# Patient Record
Sex: MALE | Race: BLACK OR AFRICAN AMERICAN | NOT HISPANIC OR LATINO | Employment: UNEMPLOYED | URBAN - METROPOLITAN AREA
[De-identification: names, ages, dates, MRNs, and addresses within clinical notes are randomized per-mention and may not be internally consistent; named-entity substitution may affect disease eponyms.]

---

## 2020-12-04 ENCOUNTER — TRANSCRIBE ORDERS (OUTPATIENT)
Dept: ADMINISTRATIVE | Facility: HOSPITAL | Age: 35
End: 2020-12-04

## 2020-12-04 DIAGNOSIS — M79.602 PAIN AND NUMBNESS OF LEFT UPPER EXTREMITY: Primary | ICD-10-CM

## 2020-12-04 DIAGNOSIS — R20.0 PAIN AND NUMBNESS OF LEFT UPPER EXTREMITY: Primary | ICD-10-CM

## 2021-01-26 ENCOUNTER — HOSPITAL ENCOUNTER (OUTPATIENT)
Dept: NEUROLOGY | Facility: HOSPITAL | Age: 36
Discharge: HOME | End: 2021-01-26
Admitting: ORTHOPAEDIC SURGERY

## 2021-01-26 DIAGNOSIS — M79.602 PAIN AND NUMBNESS OF LEFT UPPER EXTREMITY: ICD-10-CM

## 2021-01-26 DIAGNOSIS — R20.0 PAIN AND NUMBNESS OF LEFT UPPER EXTREMITY: ICD-10-CM

## 2021-01-26 PROCEDURE — 95908 NRV CNDJ TST 3-4 STUDIES: CPT

## 2021-01-26 PROCEDURE — 95886 MUSC TEST DONE W/N TEST COMP: CPT

## 2021-05-20 ENCOUNTER — HOSPITAL ENCOUNTER (OUTPATIENT)
Dept: GENERAL RADIOLOGY | Facility: HOSPITAL | Age: 36
Discharge: HOME | End: 2021-05-20

## 2021-05-20 ENCOUNTER — PRE-ADMISSION TESTING (OUTPATIENT)
Dept: PREADMISSION TESTING | Facility: HOSPITAL | Age: 36
End: 2021-05-20

## 2021-05-20 LAB
ANION GAP SERPL CALCULATED.3IONS-SCNC: 9 MMOL/L (ref 5–15)
BUN SERPL-MCNC: 12 MG/DL (ref 6–20)
BUN/CREAT SERPL: 13.2 (ref 7–25)
CALCIUM SPEC-SCNC: 9 MG/DL (ref 8.6–10.5)
CHLORIDE SERPL-SCNC: 103 MMOL/L (ref 98–107)
CO2 SERPL-SCNC: 29 MMOL/L (ref 22–29)
CREAT SERPL-MCNC: 0.91 MG/DL (ref 0.76–1.27)
DEPRECATED RDW RBC AUTO: 42.9 FL (ref 37–54)
ERYTHROCYTE [DISTWIDTH] IN BLOOD BY AUTOMATED COUNT: 12.5 % (ref 12.3–15.4)
GFR SERPL CREATININE-BSD FRML MDRD: 114 ML/MIN/1.73
GLUCOSE SERPL-MCNC: 82 MG/DL (ref 65–99)
HBA1C MFR BLD: 5.3 % (ref 4.8–5.6)
HCT VFR BLD AUTO: 41.7 % (ref 37.5–51)
HGB BLD-MCNC: 13.6 G/DL (ref 13–17.7)
MCH RBC QN AUTO: 30.6 PG (ref 26.6–33)
MCHC RBC AUTO-ENTMCNC: 32.6 G/DL (ref 31.5–35.7)
MCV RBC AUTO: 93.9 FL (ref 79–97)
PLATELET # BLD AUTO: 229 10*3/MM3 (ref 140–450)
PMV BLD AUTO: 10.4 FL (ref 6–12)
POTASSIUM SERPL-SCNC: 4.1 MMOL/L (ref 3.5–5.2)
QT INTERVAL: 412 MS
QTC INTERVAL: 414 MS
RBC # BLD AUTO: 4.44 10*6/MM3 (ref 4.14–5.8)
SODIUM SERPL-SCNC: 141 MMOL/L (ref 136–145)
WBC # BLD AUTO: 8.33 10*3/MM3 (ref 3.4–10.8)

## 2021-05-20 PROCEDURE — 71046 X-RAY EXAM CHEST 2 VIEWS: CPT

## 2021-05-20 PROCEDURE — 83036 HEMOGLOBIN GLYCOSYLATED A1C: CPT

## 2021-05-20 PROCEDURE — 80048 BASIC METABOLIC PNL TOTAL CA: CPT

## 2021-05-20 PROCEDURE — 93010 ELECTROCARDIOGRAM REPORT: CPT | Performed by: INTERNAL MEDICINE

## 2021-05-20 PROCEDURE — 93005 ELECTROCARDIOGRAM TRACING: CPT

## 2021-05-20 PROCEDURE — 85027 COMPLETE CBC AUTOMATED: CPT

## 2021-05-20 PROCEDURE — 36415 COLL VENOUS BLD VENIPUNCTURE: CPT

## 2021-06-01 ENCOUNTER — APPOINTMENT (OUTPATIENT)
Dept: PREADMISSION TESTING | Facility: HOSPITAL | Age: 36
End: 2021-06-01

## 2021-06-01 PROCEDURE — C9803 HOPD COVID-19 SPEC COLLECT: HCPCS

## 2021-06-01 PROCEDURE — U0004 COV-19 TEST NON-CDC HGH THRU: HCPCS

## 2021-06-02 LAB — SARS-COV-2 RNA PNL SPEC NAA+PROBE: NOT DETECTED

## 2021-06-03 DIAGNOSIS — M75.122 NONTRAUMATIC COMPLETE TEAR OF LEFT ROTATOR CUFF: Primary | ICD-10-CM

## 2021-06-03 RX ORDER — ONDANSETRON 4 MG/1
4 TABLET, FILM COATED ORAL EVERY 8 HOURS PRN
Qty: 12 TABLET | Refills: 0 | Status: SHIPPED | OUTPATIENT
Start: 2021-06-03

## 2021-06-03 RX ORDER — HYDROCODONE BITARTRATE AND ACETAMINOPHEN 7.5; 325 MG/1; MG/1
1 TABLET ORAL EVERY 6 HOURS PRN
Qty: 24 TABLET | Refills: 0 | Status: SHIPPED | OUTPATIENT
Start: 2021-06-03

## 2021-06-03 RX ORDER — DOCUSATE SODIUM 100 MG/1
100 CAPSULE, LIQUID FILLED ORAL 2 TIMES DAILY
Qty: 20 CAPSULE | Refills: 0 | Status: SHIPPED | OUTPATIENT
Start: 2021-06-03 | End: 2021-12-23

## 2021-09-29 ENCOUNTER — TRANSCRIBE ORDERS (OUTPATIENT)
Dept: ADMINISTRATIVE | Facility: HOSPITAL | Age: 36
End: 2021-09-29

## 2021-09-29 DIAGNOSIS — M79.603 PAIN AND NUMBNESS OF UPPER EXTREMITY: Primary | ICD-10-CM

## 2021-09-29 DIAGNOSIS — R20.0 PAIN AND NUMBNESS OF UPPER EXTREMITY: Primary | ICD-10-CM

## 2021-11-08 ENCOUNTER — HOSPITAL ENCOUNTER (OUTPATIENT)
Dept: NEUROLOGY | Facility: HOSPITAL | Age: 36
Discharge: HOME | End: 2021-11-08
Admitting: ORTHOPAEDIC SURGERY

## 2021-11-08 DIAGNOSIS — M79.603 PAIN AND NUMBNESS OF UPPER EXTREMITY: ICD-10-CM

## 2021-11-08 DIAGNOSIS — R20.0 PAIN AND NUMBNESS OF UPPER EXTREMITY: ICD-10-CM

## 2021-11-08 PROCEDURE — 95886 MUSC TEST DONE W/N TEST COMP: CPT

## 2021-11-08 PROCEDURE — 95908 NRV CNDJ TST 3-4 STUDIES: CPT

## 2021-12-23 ENCOUNTER — OFFICE VISIT (OUTPATIENT)
Dept: NEUROSURGERY | Facility: CLINIC | Age: 36
End: 2021-12-23

## 2021-12-23 VITALS
DIASTOLIC BLOOD PRESSURE: 88 MMHG | BODY MASS INDEX: 29.29 KG/M2 | TEMPERATURE: 96.9 F | WEIGHT: 186.6 LBS | SYSTOLIC BLOOD PRESSURE: 128 MMHG | HEIGHT: 67 IN

## 2021-12-23 DIAGNOSIS — M25.519 NECK AND SHOULDER PAIN: ICD-10-CM

## 2021-12-23 DIAGNOSIS — M50.30 DDD (DEGENERATIVE DISC DISEASE), CERVICAL: Primary | ICD-10-CM

## 2021-12-23 DIAGNOSIS — M54.2 NECK AND SHOULDER PAIN: ICD-10-CM

## 2021-12-23 PROCEDURE — 99203 OFFICE O/P NEW LOW 30 MIN: CPT | Performed by: NEUROLOGICAL SURGERY

## 2021-12-23 RX ORDER — MELOXICAM 15 MG/1
TABLET ORAL
COMMUNITY
Start: 2021-11-21

## 2021-12-23 RX ORDER — PREDNISONE 20 MG/1
TABLET ORAL
COMMUNITY
Start: 2021-11-23

## 2021-12-23 RX ORDER — ZOLPIDEM TARTRATE 5 MG/1
TABLET ORAL
COMMUNITY
Start: 2021-10-28

## 2021-12-23 RX ORDER — GABAPENTIN 300 MG/1
CAPSULE ORAL
COMMUNITY
Start: 2021-12-02

## 2021-12-23 RX ORDER — HYDROCODONE BITARTRATE AND ACETAMINOPHEN 10; 325 MG/1; MG/1
TABLET ORAL
COMMUNITY
Start: 2021-11-29

## 2021-12-23 NOTE — PROGRESS NOTES
NAME: JORGE LUIS ADAME   DOS: 2021  : 1985  PCP: Mari Zuñiga MD    Chief Complaint:    Chief Complaint   Patient presents with   • Neck Pain     NEW PT       History of Present Illness:  36 y.o. male   I saw this 36-year-old in neurosurgical consultation.  He has a history of chronic axial neck pain has reportedly seen 2 surgeons who have offered surgery in the past but he declined.  He had experienced a rotator cuff left upper extremity pain he underwent surgery but unfortunately is still had left-sided upper extremity dysfunction pain and numbness and tingling.  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    He is here for evaluation he states that he is got pain in the left parascapular area paraspinal supraspinatus region with occasional radiation in the hand he is here for evaluation    PMHX  Allergies:  No Known Allergies  Medications    Current Outpatient Medications:   •  gabapentin (NEURONTIN) 300 MG capsule, , Disp: , Rfl:   •  HYDROcodone-acetaminophen (NORCO)  MG per tablet, , Disp: , Rfl:   •  HYDROcodone-acetaminophen (Norco) 7.5-325 MG per tablet, Take 1 tablet by mouth Every 6 (Six) Hours As Needed for Moderate Pain ., Disp: 24 tablet, Rfl: 0  •  meloxicam (MOBIC) 15 MG tablet, , Disp: , Rfl:   •  ondansetron (Zofran) 4 MG tablet, Take 1 tablet by mouth Every 8 (Eight) Hours As Needed for Nausea or Vomiting., Disp: 12 tablet, Rfl: 0  •  predniSONE (DELTASONE) 20 MG tablet, , Disp: , Rfl:   •  zolpidem (AMBIEN) 5 MG tablet, , Disp: , Rfl:   Past Medical History:  History reviewed. No pertinent past medical history.  Past Surgical History:  Past Surgical History:   Procedure Laterality Date   • HAND SURGERY Left    • SHOULDER SURGERY       Social Hx:  Social History     Tobacco Use   • Smoking status: Current Some Day Smoker     Packs/day: 0.25   • Smokeless tobacco: Not on file   Vaping Use   •  Vaping Use: Every day   • Substances: Nicotine   • Devices: [employer] tank   Substance Use Topics   • Alcohol use: No   • Drug use: Yes     Types: Marijuana     Family Hx:  History reviewed. No pertinent family history.  Review of Systems:        Review of Systems   Constitutional: Negative for activity change, appetite change, chills, diaphoresis, fatigue, fever and unexpected weight change.   HENT: Negative for congestion, dental problem, drooling, ear discharge, ear pain, facial swelling, hearing loss, mouth sores, nosebleeds, postnasal drip, rhinorrhea, sinus pressure, sinus pain, sneezing, sore throat, tinnitus, trouble swallowing and voice change.    Eyes: Negative for photophobia, pain, discharge, redness, itching and visual disturbance.   Respiratory: Negative for apnea, cough, choking, chest tightness, shortness of breath, wheezing and stridor.    Cardiovascular: Negative for chest pain, palpitations and leg swelling.   Gastrointestinal: Negative for abdominal distention, abdominal pain, anal bleeding, blood in stool, constipation, diarrhea, nausea, rectal pain and vomiting.   Endocrine: Negative for cold intolerance, heat intolerance, polydipsia, polyphagia and polyuria.   Genitourinary: Negative for decreased urine volume, difficulty urinating, dysuria, enuresis, flank pain, frequency, genital sores, hematuria and urgency.   Musculoskeletal: Positive for neck pain. Negative for arthralgias, back pain, gait problem, joint swelling, myalgias and neck stiffness.   Skin: Negative for color change, pallor, rash and wound.   Allergic/Immunologic: Negative for environmental allergies, food allergies and immunocompromised state.   Neurological: Negative for dizziness, tremors, seizures, syncope, facial asymmetry, speech difficulty, weakness, light-headedness, numbness and headaches.   Hematological: Negative for adenopathy. Does not bruise/bleed easily.   Psychiatric/Behavioral: Negative for agitation,  "behavioral problems, confusion, decreased concentration, dysphoric mood, hallucinations, self-injury, sleep disturbance and suicidal ideas. The patient is not nervous/anxious and is not hyperactive.       I have reviewed this note template and all pertinent parts of the review of systems social, family history, surgical history and medication list      Physical Examination:  Vitals:    12/23/21 1431   BP: 128/88   Temp: 96.9 °F (36.1 °C)      General Appearance:   Well developed, well nourished, well groomed, alert, and cooperative.  Neurological examination:  Neurologic Exam  Vital signs were reviewed and documented in the chart  Patient appeared in good neurologic function with normal comprehension fluent speech  Mood and affect are normal  Sense of smell deferred  \"Chronic sinusitis \"noted mass left in place    Muscle bulk and tone normal  5 out of 5 strength no motor drift-no atrophy  Gait normal intact  Negative Romberg  No clonus long tract signs or myelopathy  He is pain with passive and active range of motion shoulders and neck  Reflexes symmetric-trace throughout  Arms show no edema noted and extremities skin appears normal          Review of Imaging/DATA:  MRI was personally reviewed that shows cervical stenosis of mild intensity his cervical spines in flex position he is got a C3-4 disc bulge he is got a C5-6 left foraminal disc bulge as well but nothing high-grade or central  Diagnoses/Plan:    Mr. Sanford is a 36 y.o. male   1.  Chronic left upper extremity dysfunction partially secondary to left prior shoulder surgery with some residual decreased range of motion with passive and active range of motion  2.  Chronic axial neck pain-overtones of chronic C6 left radiculopathy subtle in nature mostly left paraspinal in nature    I recommended  -Physical therapy  -Referral to interventional pain management for trigger point injections left C6 transforaminal block    3.  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  I will have him touch base with a PA in 90 days-if he saline conservative management we can contemplate myelogram EMG nerve conduction study if he wishes to pursue further work-up HOWEVER I explained that there is nothing that on his MRI would suggest that he require surgery or that I would be able to reliably \"fix \"given an almost 20-year pain history  If he is improved he can follow-up as needed    "

## 2024-05-23 ENCOUNTER — OFFICE VISIT (OUTPATIENT)
Dept: INTERNAL MEDICINE | Facility: CLINIC | Age: 39
End: 2024-05-23
Payer: MEDICAID

## 2024-05-23 VITALS
WEIGHT: 186.6 LBS | OXYGEN SATURATION: 95 % | HEART RATE: 64 BPM | HEIGHT: 66 IN | DIASTOLIC BLOOD PRESSURE: 68 MMHG | TEMPERATURE: 96.8 F | SYSTOLIC BLOOD PRESSURE: 136 MMHG | BODY MASS INDEX: 29.99 KG/M2

## 2024-05-23 DIAGNOSIS — Z87.19 STATUS POST INGUINAL HERNIA REPAIR: ICD-10-CM

## 2024-05-23 DIAGNOSIS — M54.2 CHRONIC NECK AND BACK PAIN: Primary | ICD-10-CM

## 2024-05-23 DIAGNOSIS — M54.9 CHRONIC NECK AND BACK PAIN: Primary | ICD-10-CM

## 2024-05-23 DIAGNOSIS — Z98.890 STATUS POST INGUINAL HERNIA REPAIR: ICD-10-CM

## 2024-05-23 DIAGNOSIS — K40.90 INGUINAL HERNIA WITHOUT OBSTRUCTION OR GANGRENE, RECURRENCE NOT SPECIFIED, UNSPECIFIED LATERALITY: ICD-10-CM

## 2024-05-23 DIAGNOSIS — K59.03 DRUG-INDUCED CONSTIPATION: ICD-10-CM

## 2024-05-23 DIAGNOSIS — M50.30 DEGENERATIVE CERVICAL DISC: ICD-10-CM

## 2024-05-23 DIAGNOSIS — G89.29 CHRONIC NECK AND BACK PAIN: Primary | ICD-10-CM

## 2024-05-23 PROCEDURE — 99204 OFFICE O/P NEW MOD 45 MIN: CPT | Performed by: STUDENT IN AN ORGANIZED HEALTH CARE EDUCATION/TRAINING PROGRAM

## 2024-05-23 RX ORDER — TIZANIDINE 4 MG/1
4 TABLET ORAL EVERY 6 HOURS PRN
COMMUNITY

## 2024-05-23 RX ORDER — GABAPENTIN 400 MG/1
400 CAPSULE ORAL DAILY
Qty: 30 CAPSULE | Refills: 2 | Status: SHIPPED | OUTPATIENT
Start: 2024-05-23

## 2024-05-23 RX ORDER — GABAPENTIN 800 MG/1
800 TABLET ORAL 2 TIMES DAILY
COMMUNITY
Start: 2023-11-27 | End: 2024-05-23

## 2024-05-23 RX ORDER — OXYCODONE AND ACETAMINOPHEN 10; 325 MG/1; MG/1
1 TABLET ORAL EVERY 8 HOURS PRN
COMMUNITY
Start: 2024-04-01 | End: 2025-05-01

## 2024-05-23 RX ORDER — OXYCODONE AND ACETAMINOPHEN 10; 325 MG/1; MG/1
1 TABLET ORAL EVERY 8 HOURS PRN
Qty: 90 TABLET | Refills: 0 | Status: SHIPPED | OUTPATIENT
Start: 2024-05-23

## 2024-05-23 RX ORDER — KETOROLAC TROMETHAMINE 10 MG/1
10 TABLET, FILM COATED ORAL EVERY 6 HOURS PRN
COMMUNITY
Start: 2024-03-25
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
## 2024-05-24 ENCOUNTER — PATIENT ROUNDING (BHMG ONLY) (OUTPATIENT)
Dept: INTERNAL MEDICINE | Facility: CLINIC | Age: 39
End: 2024-05-24
Payer: MEDICAID

## 2024-05-24 NOTE — PROGRESS NOTES
My name is Arlen Pennington and I am a patient experience representative for [facility] on [facility].    I would like to officially welcome you to our practice.  If you do not mind I would like to ask you a few questions about your recent visit with us.  If you do not have the time to reply that is perfectly fine.      First, could you tell me what went well with your recent visit?     Secondly, we are always looking for ways to make our patients' experiences even better. Do you have any recommendations on ways we may improve?     Third, safety is a top priority therefore do you have any concerns with that while in our office?    Finally, overall were you satisfied with your first visit to our practice?     Thank you for taking the time to answer a few questions today.  In the coming days you will receive a survey.  We encourage you to complete the survey to let us know how we did!        Arlen

## 2024-05-28 ENCOUNTER — PRIOR AUTHORIZATION (OUTPATIENT)
Dept: INTERNAL MEDICINE | Facility: CLINIC | Age: 39
End: 2024-05-28
Payer: MEDICAID

## 2024-05-28 NOTE — TELEPHONE ENCOUNTER
PA submitted on linzess 72 mcg.  KEY:  [id number]  Can you tell me the diagnosis for this medication?  Office note is not complete.

## 2024-05-29 ENCOUNTER — TELEPHONE (OUTPATIENT)
Dept: INTERNAL MEDICINE | Facility: CLINIC | Age: 39
End: 2024-05-29

## 2024-05-29 NOTE — TELEPHONE ENCOUNTER
Caller: [facility] [id number] - [place], [place] - [address] - [phone]  - [phone] FX    Relationship: Pharmacy    What was the call regarding: PHARMACY HAS CONCERNS THAT MULTIPLE TIMES PATIENT HAS COME TO  OXYCODONE HE HAS BEEN UNDER THE INFLUENCE OF MARIJUANA. ALSO PICKING UP HIS MEDICATION EARLY EVERY MONTH. LEFT A JOINT ON THE COUNTER. REQUESTING THAT PRESCRIPTION ONLY TO BE REFILLED EVERY 30 DAYS. HE IS COMING IN EVERY 28 DAYS TO GET MEDICATION.

## 2024-06-02 LAB
DRUGS UR: NORMAL
Lab: NORMAL

## 2024-06-06 ENCOUNTER — OFFICE VISIT (OUTPATIENT)
Dept: INTERNAL MEDICINE | Facility: CLINIC | Age: 39
End: 2024-06-06
Payer: MEDICAID

## 2024-06-06 VITALS
BODY MASS INDEX: 30.37 KG/M2 | WEIGHT: 189 LBS | OXYGEN SATURATION: 99 % | TEMPERATURE: 97.1 F | SYSTOLIC BLOOD PRESSURE: 112 MMHG | HEIGHT: 66 IN | DIASTOLIC BLOOD PRESSURE: 80 MMHG | HEART RATE: 68 BPM

## 2024-06-06 DIAGNOSIS — M50.30 DEGENERATIVE CERVICAL DISC: ICD-10-CM

## 2024-06-06 DIAGNOSIS — M54.2 CHRONIC NECK AND BACK PAIN: Primary | ICD-10-CM

## 2024-06-06 DIAGNOSIS — Z76.0 MEDICATION REFILL: ICD-10-CM

## 2024-06-06 DIAGNOSIS — G89.29 CHRONIC NECK AND BACK PAIN: Primary | ICD-10-CM

## 2024-06-06 DIAGNOSIS — K59.03 DRUG-INDUCED CONSTIPATION: ICD-10-CM

## 2024-06-06 DIAGNOSIS — G62.9 NEUROPATHY: ICD-10-CM

## 2024-06-06 DIAGNOSIS — M54.9 CHRONIC NECK AND BACK PAIN: Primary | ICD-10-CM

## 2024-06-06 PROCEDURE — 1159F MED LIST DOCD IN RCRD: CPT | Performed by: STUDENT IN AN ORGANIZED HEALTH CARE EDUCATION/TRAINING PROGRAM

## 2024-06-06 PROCEDURE — 1160F RVW MEDS BY RX/DR IN RCRD: CPT | Performed by: STUDENT IN AN ORGANIZED HEALTH CARE EDUCATION/TRAINING PROGRAM

## 2024-06-06 PROCEDURE — 99214 OFFICE O/P EST MOD 30 MIN: CPT | Performed by: STUDENT IN AN ORGANIZED HEALTH CARE EDUCATION/TRAINING PROGRAM

## 2024-06-06 RX ORDER — SODIUM CHLORIDE 0.65 %
1 AEROSOL, SPRAY (ML) NASAL AS NEEDED
COMMUNITY
Start: 2024-05-20
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
## 2024-06-18 ENCOUNTER — TELEPHONE (OUTPATIENT)
Dept: INTERNAL MEDICINE | Facility: CLINIC | Age: 39
End: 2024-06-18
Payer: MEDICAID

## 2024-06-19 DIAGNOSIS — M54.9 CHRONIC NECK AND BACK PAIN: ICD-10-CM

## 2024-06-19 DIAGNOSIS — M54.2 CHRONIC NECK AND BACK PAIN: ICD-10-CM

## 2024-06-19 DIAGNOSIS — G89.29 CHRONIC NECK AND BACK PAIN: ICD-10-CM

## 2024-06-19 RX ORDER — OXYCODONE AND ACETAMINOPHEN 10; 325 MG/1; MG/1
1 TABLET ORAL EVERY 8 HOURS PRN
Qty: 90 TABLET | Refills: 0 | Status: SHIPPED | OUTPATIENT
Start: 2024-06-23 | End: 2024-07-23

## 2024-06-19 RX ORDER — GABAPENTIN 400 MG/1
400 CAPSULE ORAL 2 TIMES DAILY
Qty: 180 CAPSULE | Refills: 0 | Status: SHIPPED | OUTPATIENT
Start: 2024-06-23 | End: 2024-09-21

## 2024-06-19 NOTE — TELEPHONE ENCOUNTER
Pharmacy Name:  [facility] [id number] - [place], [place] - [address] - [phone]  - [phone] FX (Pharmacy)     Pharmacy representative name: VIVEK    Pharmacy representative phone number: [phone]    What medication are you calling in regards to: GABAPENTIN    What question does the pharmacy have: VIVEK STATED THAT THE PATIENT IS GETTING THIS MEDICATION FROM ANOTHER PROVIDER. PATIENT IS TAKING  800 MG TWICE A DAY. VIVEK DIDN'T KNOW IF DR LANDAVERDE WAS AWARE.     Who is the provider that prescribed the medication: DR LANDAVERDE  
Dr. Zuñiga is his old PCP who is gone. His next refill should be for 6/20/2024 
Spoke with the pharmacy and let them know not to fill any medication from a Dr. Zuñiga.  Only fill gabapentin from Dr. Jurado.  Pharmacy verbalized understanding and canceled the old script.  
Spoke with the pharmacy and patient did have a script that had now  from a Dr. Zuñiga.  This was written 6 months ago.  He should have some left over from that script.  He said it was a little to early for a refill.  I asked he hold the script from Dr. Jurado until he replies to this message.  
no

## 2024-06-24 ENCOUNTER — PRIOR AUTHORIZATION (OUTPATIENT)
Dept: INTERNAL MEDICINE | Facility: CLINIC | Age: 39
End: 2024-06-24
Payer: MEDICAID

## 2024-06-24 RX ORDER — NALOXONE HYDROCHLORIDE 4 MG/.1ML
SPRAY NASAL
Qty: 2 EACH | Refills: 1 | Status: SHIPPED | OUTPATIENT
Start: 2024-06-24

## 2024-06-24 NOTE — TELEPHONE ENCOUNTER
PA submitted on oxycodone .  KEY:  [id number]  For this PA can you prescribe narcan for this patient?

## 2024-07-18 DIAGNOSIS — G89.29 CHRONIC NECK AND BACK PAIN: ICD-10-CM

## 2024-07-18 DIAGNOSIS — M54.9 CHRONIC NECK AND BACK PAIN: ICD-10-CM

## 2024-07-18 DIAGNOSIS — M50.30 DEGENERATIVE CERVICAL DISC: ICD-10-CM

## 2024-07-18 DIAGNOSIS — M54.2 CHRONIC NECK AND BACK PAIN: ICD-10-CM

## 2024-07-18 NOTE — TELEPHONE ENCOUNTER
Rx Refill Note  Requested Prescriptions     Pending Prescriptions Disp Refills    tiZANidine (ZANAFLEX) 4 MG tablet       Sig: Take 1 tablet by mouth Every 6 (Six) Hours As Needed.    gabapentin (NEURONTIN) 400 MG capsule 180 capsule 0     Sig: Take 1 capsule by mouth 2 (Two) Times a Day for 90 days.    oxyCODONE-acetaminophen (Percocet)  MG per tablet 90 tablet 0     Sig: Take 1 tablet by mouth Every 8 (Eight) Hours As Needed for Moderate Pain for up to 30 days.      Last office visit with prescribing clinician: 6/6/2024   Last telemedicine visit with prescribing clinician: Visit date not found   Next office visit with prescribing clinician: 8/28/2024     Brenna Phillips MA  07/18/24, 11:51 EDT

## 2024-07-19 RX ORDER — TIZANIDINE 4 MG/1
4 TABLET ORAL EVERY 8 HOURS PRN
Qty: 90 TABLET | Refills: 2 | Status: SHIPPED | OUTPATIENT
Start: 2024-07-19

## 2024-07-19 RX ORDER — OXYCODONE AND ACETAMINOPHEN 10; 325 MG/1; MG/1
1 TABLET ORAL EVERY 8 HOURS PRN
Qty: 90 TABLET | Refills: 0 | Status: SHIPPED | OUTPATIENT
Start: 2024-07-19 | End: 2024-08-18

## 2024-07-19 RX ORDER — GABAPENTIN 400 MG/1
400 CAPSULE ORAL 2 TIMES DAILY
Qty: 180 CAPSULE | Refills: 0 | Status: SHIPPED | OUTPATIENT
Start: 2024-07-19 | End: 2024-10-17

## 2024-08-18 DIAGNOSIS — M54.2 CHRONIC NECK AND BACK PAIN: ICD-10-CM

## 2024-08-18 DIAGNOSIS — G89.29 CHRONIC NECK AND BACK PAIN: ICD-10-CM

## 2024-08-18 DIAGNOSIS — M50.30 DEGENERATIVE CERVICAL DISC: ICD-10-CM

## 2024-08-18 DIAGNOSIS — M54.9 CHRONIC NECK AND BACK PAIN: ICD-10-CM

## 2024-08-19 RX ORDER — OXYCODONE AND ACETAMINOPHEN 10; 325 MG/1; MG/1
1 TABLET ORAL EVERY 8 HOURS PRN
Qty: 90 TABLET | Refills: 0 | Status: SHIPPED | OUTPATIENT
Start: 2024-08-19 | End: 2024-09-18

## 2024-08-19 RX ORDER — GABAPENTIN 400 MG/1
400 CAPSULE ORAL 2 TIMES DAILY
Qty: 180 CAPSULE | Refills: 0 | Status: SHIPPED | OUTPATIENT
Start: 2024-08-19 | End: 2024-11-17

## 2024-08-19 RX ORDER — TIZANIDINE 4 MG/1
4 TABLET ORAL EVERY 8 HOURS PRN
Qty: 90 TABLET | Refills: 2 | Status: SHIPPED | OUTPATIENT
Start: 2024-08-19

## 2024-08-19 NOTE — TELEPHONE ENCOUNTER
REFILL REQUEST TIZANIDINE TOO SOON TO REFILL   LAST REFILL 7/19/24     GABAPENTIN TOO SOON TO REFILL   LAST REFILL 7/19/24    OXYCODONE   LAST VISIT 7/19/24  LAST VISIT 6/6/24  NEXT VISIT 8/28/24

## 2024-08-25 DIAGNOSIS — K59.03 DRUG-INDUCED CONSTIPATION: ICD-10-CM

## 2024-08-26 RX ORDER — LINACLOTIDE 72 UG/1
72 CAPSULE, GELATIN COATED ORAL
Qty: 90 CAPSULE | Refills: 0 | Status: SHIPPED | OUTPATIENT
Start: 2024-08-26

## 2024-08-28 ENCOUNTER — OFFICE VISIT (OUTPATIENT)
Dept: INTERNAL MEDICINE | Facility: CLINIC | Age: 39
End: 2024-08-28
Payer: MEDICAID

## 2024-08-28 VITALS
TEMPERATURE: 97.1 F | DIASTOLIC BLOOD PRESSURE: 72 MMHG | SYSTOLIC BLOOD PRESSURE: 132 MMHG | HEART RATE: 61 BPM | BODY MASS INDEX: 31.27 KG/M2 | WEIGHT: 194.6 LBS | OXYGEN SATURATION: 96 % | HEIGHT: 66 IN

## 2024-08-28 DIAGNOSIS — M54.9 CHRONIC NECK AND BACK PAIN: Primary | ICD-10-CM

## 2024-08-28 DIAGNOSIS — G62.9 NEUROPATHY: ICD-10-CM

## 2024-08-28 DIAGNOSIS — M54.2 CHRONIC NECK AND BACK PAIN: Primary | ICD-10-CM

## 2024-08-28 DIAGNOSIS — Z76.0 MEDICATION REFILL: ICD-10-CM

## 2024-08-28 DIAGNOSIS — G89.29 CHRONIC NECK AND BACK PAIN: Primary | ICD-10-CM

## 2024-08-28 PROCEDURE — 1160F RVW MEDS BY RX/DR IN RCRD: CPT | Performed by: STUDENT IN AN ORGANIZED HEALTH CARE EDUCATION/TRAINING PROGRAM

## 2024-08-28 PROCEDURE — 1159F MED LIST DOCD IN RCRD: CPT | Performed by: STUDENT IN AN ORGANIZED HEALTH CARE EDUCATION/TRAINING PROGRAM

## 2024-08-28 PROCEDURE — 99214 OFFICE O/P EST MOD 30 MIN: CPT | Performed by: STUDENT IN AN ORGANIZED HEALTH CARE EDUCATION/TRAINING PROGRAM

## 2024-08-28 RX ORDER — VENLAFAXINE HYDROCHLORIDE 37.5 MG/1
37.5 CAPSULE, EXTENDED RELEASE ORAL DAILY
COMMUNITY
Start: 2024-07-24

## 2024-08-28 RX ORDER — BACLOFEN 10 MG/1
10 TABLET ORAL DAILY
Qty: 30 TABLET | Refills: 0 | Status: SHIPPED | OUTPATIENT
Start: 2024-08-28

## 2024-08-28 NOTE — PROGRESS NOTES
Office Note     Name: Ernie Sanford    : 1985     MRN: [id number]     Chief Complaint  Med Refill    Subjective     History of Present Illness:  Ernie Sanford is a 38 y.o. male who presents today for     Follow up for chronic neck pain  He is one chronic opioid pain therapy for this. He takes percocet TID  He sees orthopedics at [facility] and was suppose to see Pain management with Dr. Gallagher for his chronic neck pain.   To discuss interventional pain management, he wants to hold off on surgery for now.    Has complaints of pain down both his arms with numbness/paresthesias. Denies motor weakness, no loss of sensation. He is able to use his hands to complete tasks but deals with aches in his joints.   In the past he has tried, PT, multiple injections, he has seen the neck doctor and interventional pain management, he has discussed having surgery for his neck.       Review of Systems:   Review of Systems   All other systems reviewed and are negative.      Past Medical History:   Past Medical History:   Diagnosis Date    Arthritis     Drug induced constipation     Headache        Past Surgical History:   Past Surgical History:   Procedure Laterality Date    HAND SURGERY Left     HERNIA REPAIR      SHOULDER SURGERY         Family History:   Family History   Problem Relation Age of Onset    Hypertension Mother     Diabetes Mother        Social History:   Social History     Socioeconomic History    Marital status:    Tobacco Use    Smoking status: Some Days     Current packs/day: 0.25     Types: Cigarettes    Smokeless tobacco: Never   Vaping Use    Vaping status: Every Day    Substances: Nicotine    Devices: Refillable tank   Substance and Sexual Activity    Alcohol use: No    Drug use: Yes     Types: Marijuana    Sexual activity: Defer       Immunizations:   Immunization History   Administered Date(s) Administered    COVID-19 (PFIZER) Purple Cap Monovalent 2021, 2021    Hep  "B, Adolescent or Pediatric 10/04/1999    Hep B, Unspecified 08/13/1996    Hepatitis B Adolescent High Risk Infant 03/20/1998    Hepatitis B Adult/Adolescent IM 01/14/2005, 02/18/2005, 04/25/2007    MMR 08/13/1996    PPD Test 01/12/2005, 01/25/2005, 04/25/2007    Td (TDVAX) 03/20/1998, 04/24/2012        Medications:     Current Outpatient Medications:     [facility] Saline Nasal [place] 0.65 % nasal spray, 1 spray into the nostril(s) as directed by provider As Needed., Disp: , Rfl:     gabapentin (NEURONTIN) 400 MG capsule, Take 1 capsule by mouth 2 (Two) Times a Day for 90 days., Disp: 180 capsule, Rfl: 0    ketorolac (TORADOL) 10 MG tablet, Take 1 tablet by mouth Every 6 (Six) Hours As Needed., Disp: , Rfl:     Linzess 72 MCG capsule capsule, TAKE ONE CAPSULE BY MOUTH EVERY MORNING BEFORE BREAKFAST, Disp: 90 capsule, Rfl: 0    naloxone (NARCAN) 4 MG/0.1ML nasal spray, Call 911. Don't prime. [place] in 1 nostril for overdose. Repeat in 2-3 minutes in other nostril if no or minimal breathing/responsiveness., Disp: 2 each, Rfl: 1    oxyCODONE-acetaminophen (Percocet)  MG per tablet, Take 1 tablet by mouth Every 8 (Eight) Hours As Needed for Moderate Pain for up to 30 days., Disp: 90 tablet, Rfl: 0    tiZANidine (ZANAFLEX) 4 MG tablet, Take 1 tablet by mouth Every 8 (Eight) Hours As Needed for Muscle Spasms., Disp: 90 tablet, Rfl: 2    venlafaxine XR (EFFEXOR-XR) 37.5 MG 24 hr capsule, Take 1 capsule by mouth Daily., Disp: , Rfl:     baclofen (LIORESAL) 10 MG tablet, Take 1 tablet by mouth Daily., Disp: 30 tablet, Rfl: 0    meloxicam (MOBIC) 15 MG tablet, , Disp: , Rfl:     Allergies:   Allergies   Allergen Reactions    Bupropion Rash     \"Snapped\" emotional change    Acetaminophen Rash    Aspirin Rash    Ibuprofen Rash       Objective     Vital Signs  /72   Pulse 61   Temp 97.1 °F (36.2 °C) (Temporal)   Ht 168.3 cm (66.26\")   Wt 88.3 kg (194 lb 9.6 oz)   SpO2 96%   BMI 31.16 kg/m²   Estimated body mass index is " "31.16 kg/m² as calculated from the following:    Height as of this encounter: 168.3 cm (66.26\").    Weight as of this encounter: 88.3 kg (194 lb 9.6 oz).            Physical Exam  Constitutional:       Appearance: Normal appearance.   Cardiovascular:      Rate and Rhythm: Normal rate and regular rhythm.      Pulses: Normal pulses.      Heart sounds: Normal heart sounds.   Pulmonary:      Effort: Pulmonary effort is normal.      Breath sounds: Normal breath sounds.   Neurological:      Mental Status: He is alert.          Result Review :                  Assessment and Plan     1. Chronic neck and back pain  Trial of baclofen   Med refills for gabapentin and percocet have been sent, salomón reviewed, UDS on file CSA signed    - baclofen (LIORESAL) 10 MG tablet; Take 1 tablet by mouth Daily.  Dispense: 30 tablet; Refill: 0    2. Neuropathy  Med refills for gabapentin and percocet have been sent, salomón reviewed, UDS on file CSA signed    - baclofen (LIORESAL) 10 MG tablet; Take 1 tablet by mouth Daily.  Dispense: 30 tablet; Refill: 0    3. Medication refill    - venlafaxine XR (EFFEXOR-XR) 37.5 MG 24 hr capsule; Take 1 capsule by mouth Daily.  - baclofen (LIORESAL) 10 MG tablet; Take 1 tablet by mouth Daily.  Dispense: 30 tablet; Refill: 0       Follow Up  Return in about 2 months (around 11/11/2024).    Gus Jurado MD  [facility]  [facility]  [address] [address] [phone]    "

## 2024-09-16 DIAGNOSIS — Z76.0 MEDICATION REFILL: ICD-10-CM

## 2024-09-16 DIAGNOSIS — G89.29 CHRONIC NECK AND BACK PAIN: ICD-10-CM

## 2024-09-16 DIAGNOSIS — M54.9 CHRONIC NECK AND BACK PAIN: ICD-10-CM

## 2024-09-16 DIAGNOSIS — K59.03 DRUG-INDUCED CONSTIPATION: ICD-10-CM

## 2024-09-16 DIAGNOSIS — M54.2 CHRONIC NECK AND BACK PAIN: ICD-10-CM

## 2024-09-16 DIAGNOSIS — G62.9 NEUROPATHY: ICD-10-CM

## 2024-09-16 RX ORDER — VENLAFAXINE HYDROCHLORIDE 37.5 MG/1
37.5 CAPSULE, EXTENDED RELEASE ORAL DAILY
Qty: 90 CAPSULE | Refills: 3 | Status: SHIPPED | OUTPATIENT
Start: 2024-09-16

## 2024-09-16 RX ORDER — OXYCODONE AND ACETAMINOPHEN 10; 325 MG/1; MG/1
1 TABLET ORAL EVERY 8 HOURS PRN
Qty: 90 TABLET | Refills: 0 | Status: SHIPPED | OUTPATIENT
Start: 2024-09-16 | End: 2024-10-16

## 2024-09-16 RX ORDER — BACLOFEN 10 MG/1
10 TABLET ORAL DAILY
Qty: 30 TABLET | Refills: 0 | Status: SHIPPED | OUTPATIENT
Start: 2024-09-16

## 2024-10-14 DIAGNOSIS — M54.9 CHRONIC NECK AND BACK PAIN: ICD-10-CM

## 2024-10-14 DIAGNOSIS — M54.2 CHRONIC NECK AND BACK PAIN: ICD-10-CM

## 2024-10-14 DIAGNOSIS — G89.29 CHRONIC NECK AND BACK PAIN: ICD-10-CM

## 2024-10-14 RX ORDER — OXYCODONE AND ACETAMINOPHEN 10; 325 MG/1; MG/1
1 TABLET ORAL EVERY 8 HOURS PRN
Qty: 90 TABLET | Refills: 0 | Status: SHIPPED | OUTPATIENT
Start: 2024-10-14 | End: 2024-11-13

## 2024-10-28 DIAGNOSIS — M54.9 CHRONIC NECK AND BACK PAIN: ICD-10-CM

## 2024-10-28 DIAGNOSIS — M54.2 CHRONIC NECK AND BACK PAIN: ICD-10-CM

## 2024-10-28 DIAGNOSIS — G89.29 CHRONIC NECK AND BACK PAIN: ICD-10-CM

## 2024-10-28 DIAGNOSIS — G62.9 NEUROPATHY: ICD-10-CM

## 2024-10-28 DIAGNOSIS — Z76.0 MEDICATION REFILL: ICD-10-CM

## 2024-10-28 RX ORDER — BACLOFEN 10 MG/1
10 TABLET ORAL DAILY
Qty: 30 TABLET | Refills: 0 | Status: SHIPPED | OUTPATIENT
Start: 2024-10-28 | End: 2024-11-04 | Stop reason: SDUPTHER

## 2024-10-28 NOTE — TELEPHONE ENCOUNTER
Rx Refill Note  Requested Prescriptions     Pending Prescriptions Disp Refills    baclofen (LIORESAL) 10 MG tablet [Pharmacy Med Name: BACLOFEN 10 MG TABLET] 30 tablet 0     Sig: TAKE 1 TABLET BY MOUTH DAILY      Last office visit with prescribing clinician: 8/28/2024   Last telemedicine visit with prescribing clinician: Visit date not found   Next office visit with prescribing clinician: 11/4/2024     Brenna Phillips MA  10/28/24, 09:50 EDT

## 2024-11-04 ENCOUNTER — OFFICE VISIT (OUTPATIENT)
Dept: INTERNAL MEDICINE | Facility: CLINIC | Age: 39
End: 2024-11-04
Payer: MEDICAID

## 2024-11-04 DIAGNOSIS — M54.2 CHRONIC NECK AND BACK PAIN: Primary | ICD-10-CM

## 2024-11-04 DIAGNOSIS — M54.9 CHRONIC NECK AND BACK PAIN: Primary | ICD-10-CM

## 2024-11-04 DIAGNOSIS — Z76.0 MEDICATION REFILL: ICD-10-CM

## 2024-11-04 DIAGNOSIS — G62.9 NEUROPATHY: ICD-10-CM

## 2024-11-04 DIAGNOSIS — G89.29 CHRONIC NECK AND BACK PAIN: Primary | ICD-10-CM

## 2024-11-04 PROCEDURE — 1160F RVW MEDS BY RX/DR IN RCRD: CPT | Performed by: STUDENT IN AN ORGANIZED HEALTH CARE EDUCATION/TRAINING PROGRAM

## 2024-11-04 PROCEDURE — 1159F MED LIST DOCD IN RCRD: CPT | Performed by: STUDENT IN AN ORGANIZED HEALTH CARE EDUCATION/TRAINING PROGRAM

## 2024-11-04 PROCEDURE — 99214 OFFICE O/P EST MOD 30 MIN: CPT | Performed by: STUDENT IN AN ORGANIZED HEALTH CARE EDUCATION/TRAINING PROGRAM

## 2024-11-04 RX ORDER — GABAPENTIN 400 MG/1
400 CAPSULE ORAL 2 TIMES DAILY
Qty: 180 CAPSULE | Refills: 0 | Status: SHIPPED | OUTPATIENT
Start: 2024-11-15

## 2024-11-04 RX ORDER — OXYCODONE AND ACETAMINOPHEN 10; 325 MG/1; MG/1
1 TABLET ORAL EVERY 6 HOURS PRN
Qty: 120 TABLET | Refills: 0 | Status: SHIPPED | OUTPATIENT
Start: 2024-11-15 | End: 2024-12-08 | Stop reason: SDUPTHER

## 2024-11-04 RX ORDER — BACLOFEN 10 MG/1
10 TABLET ORAL 3 TIMES DAILY PRN
Qty: 90 TABLET | Refills: 2 | Status: SHIPPED | OUTPATIENT
Start: 2024-11-04

## 2024-11-04 NOTE — PROGRESS NOTES
Office Note     Name: Ernie Sanford    : 1985     MRN: [id number]     Chief Complaint  Med Refill, Neck Pain, and Back Pain    Subjective     History of Present Illness:  Ernie Sanford is a 38 y.o. male who presents today for     Follow up for chronic neck pain  He is one chronic opioid pain therapy for this. He takes percocet TID  He sees orthopedics at [facility] and was suppose to see Pain management with Dr. Gallagher for his chronic neck pain.   To discuss interventional pain management, he wants to hold off on surgery for now.    Has complaints of pain down both his arms with numbness/paresthesias. Denies motor weakness, no loss of sensation. He is able to use his hands to complete tasks but deals with aches in his joints.   In the past he has tried, PT, multiple injections, he has seen the neck doctor and interventional pain management, he has discussed having surgery for his neck.       Review of Systems:   Review of Systems   All other systems reviewed and are negative.      Past Medical History:   Past Medical History:   Diagnosis Date    Arthritis     Drug induced constipation     Headache        Past Surgical History:   Past Surgical History:   Procedure Laterality Date    HAND SURGERY Left     HERNIA REPAIR      SHOULDER SURGERY         Family History:   Family History   Problem Relation Age of Onset    Hypertension Mother     Diabetes Mother        Social History:   Social History     Socioeconomic History    Marital status:    Tobacco Use    Smoking status: Some Days     Current packs/day: 0.25     Types: Cigarettes    Smokeless tobacco: Never   Vaping Use    Vaping status: Every Day    Substances: Nicotine    Devices: Refillable tank   Substance and Sexual Activity    Alcohol use: No    Drug use: Yes     Types: Marijuana    Sexual activity: Defer       Immunizations:   Immunization History   Administered Date(s) Administered    COVID-19 (PFIZER) Purple Cap Monovalent  "06/21/2021, 07/23/2021    Hep B, Adolescent or Pediatric 10/04/1999    Hep B, Unspecified 08/13/1996    Hepatitis B Adolescent High Risk Infant 03/20/1998    Hepatitis B Adult/Adolescent IM 01/14/2005, 02/18/2005, 04/25/2007    MMR 08/13/1996    PPD Test 01/12/2005, 01/25/2005, 04/25/2007    Td (TDVAX) 03/20/1998, 04/24/2012        Medications:     Current Outpatient Medications:     baclofen (LIORESAL) 10 MG tablet, Take 1 tablet by mouth 3 (Three) Times a Day As Needed for Muscle Spasms., Disp: 90 tablet, Rfl: 2    oxyCODONE-acetaminophen (Percocet)  MG per tablet, Take 1 tablet by mouth Every 6 (Six) Hours As Needed for Moderate Pain for up to 30 days., Disp: 120 tablet, Rfl: 0    [facility] Saline Nasal [place] 0.65 % nasal spray, 1 spray into the nostril(s) as directed by provider As Needed., Disp: , Rfl:     gabapentin (NEURONTIN) 400 MG capsule, Take 1 capsule by mouth 2 (Two) Times a Day for 90 days., Disp: 180 capsule, Rfl: 0    gabapentin (NEURONTIN) 400 MG capsule, Take 1 capsule by mouth 2 (Two) Times a Day., Disp: 180 capsule, Rfl: 0    ketorolac (TORADOL) 10 MG tablet, Take 1 tablet by mouth Every 6 (Six) Hours As Needed., Disp: , Rfl:     Linzess 72 MCG capsule capsule, TAKE ONE CAPSULE BY MOUTH EVERY MORNING BEFORE BREAKFAST, Disp: 90 capsule, Rfl: 0    meloxicam (MOBIC) 15 MG tablet, , Disp: , Rfl:     naloxone (NARCAN) 4 MG/0.1ML nasal spray, Call 911. Don't prime. [place] in 1 nostril for overdose. Repeat in 2-3 minutes in other nostril if no or minimal breathing/responsiveness., Disp: 2 each, Rfl: 1    tiZANidine (ZANAFLEX) 4 MG tablet, Take 1 tablet by mouth Every 8 (Eight) Hours As Needed for Muscle Spasms., Disp: 90 tablet, Rfl: 2    venlafaxine XR (EFFEXOR-XR) 37.5 MG 24 hr capsule, Take 1 capsule by mouth Daily., Disp: 90 capsule, Rfl: 3    Allergies:   Allergies   Allergen Reactions    Bupropion Rash     \"Snapped\" emotional change    Acetaminophen Rash    Aspirin Rash    Ibuprofen Rash " "      Objective     Vital Signs  There were no vitals taken for this visit.  Estimated body mass index is 31.16 kg/m² as calculated from the following:    Height as of 8/28/24: 168.3 cm (66.26\").    Weight as of 8/28/24: 88.3 kg (194 lb 9.6 oz).            Physical Exam  Constitutional:       Appearance: Normal appearance.   Cardiovascular:      Rate and Rhythm: Normal rate and regular rhythm.      Pulses: Normal pulses.      Heart sounds: Normal heart sounds.   Pulmonary:      Effort: Pulmonary effort is normal.      Breath sounds: Normal breath sounds.   Neurological:      Mental Status: He is alert.          Result Review :                  Assessment and Plan     1. Chronic neck and back pain    - baclofen (LIORESAL) 10 MG tablet; Take 1 tablet by mouth 3 (Three) Times a Day As Needed for Muscle Spasms.  Dispense: 90 tablet; Refill: 2  - oxyCODONE-acetaminophen (Percocet)  MG per tablet; Take 1 tablet by mouth Every 6 (Six) Hours As Needed for Moderate Pain for up to 30 days.  Dispense: 120 tablet; Refill: 0  - gabapentin (NEURONTIN) 400 MG capsule; Take 1 capsule by mouth 2 (Two) Times a Day.  Dispense: 180 capsule; Refill: 0    2. Neuropathy    - baclofen (LIORESAL) 10 MG tablet; Take 1 tablet by mouth 3 (Three) Times a Day As Needed for Muscle Spasms.  Dispense: 90 tablet; Refill: 2  - oxyCODONE-acetaminophen (Percocet)  MG per tablet; Take 1 tablet by mouth Every 6 (Six) Hours As Needed for Moderate Pain for up to 30 days.  Dispense: 120 tablet; Refill: 0  - gabapentin (NEURONTIN) 400 MG capsule; Take 1 capsule by mouth 2 (Two) Times a Day.  Dispense: 180 capsule; Refill: 0    3. Medication refill    - baclofen (LIORESAL) 10 MG tablet; Take 1 tablet by mouth 3 (Three) Times a Day As Needed for Muscle Spasms.  Dispense: 90 tablet; Refill: 2  - oxyCODONE-acetaminophen (Percocet)  MG per tablet; Take 1 tablet by mouth Every 6 (Six) Hours As Needed for Moderate Pain for up to 30 days.  " Dispense: 120 tablet; Refill: 0  - gabapentin (NEURONTIN) 400 MG capsule; Take 1 capsule by mouth 2 (Two) Times a Day.  Dispense: 180 capsule; Refill: 0       Follow Up  Return in about 3 months (around 2/4/2025).    Gus Jurado MD  [facility]  [facility]  [address]  [address] [address] [phone]

## 2024-11-24 DIAGNOSIS — K59.03 DRUG-INDUCED CONSTIPATION: ICD-10-CM

## 2024-11-25 RX ORDER — LINACLOTIDE 72 UG/1
72 CAPSULE, GELATIN COATED ORAL
Qty: 90 CAPSULE | Refills: 0 | Status: SHIPPED | OUTPATIENT
Start: 2024-11-25

## 2024-12-03 VITALS — SYSTOLIC BLOOD PRESSURE: 120 MMHG | DIASTOLIC BLOOD PRESSURE: 80 MMHG | HEART RATE: 70 BPM

## 2024-12-08 DIAGNOSIS — G62.9 NEUROPATHY: ICD-10-CM

## 2024-12-08 DIAGNOSIS — Z76.0 MEDICATION REFILL: ICD-10-CM

## 2024-12-08 DIAGNOSIS — M54.2 CHRONIC NECK AND BACK PAIN: ICD-10-CM

## 2024-12-08 DIAGNOSIS — G89.29 CHRONIC NECK AND BACK PAIN: ICD-10-CM

## 2024-12-08 DIAGNOSIS — M54.9 CHRONIC NECK AND BACK PAIN: ICD-10-CM

## 2024-12-09 RX ORDER — OXYCODONE AND ACETAMINOPHEN 10; 325 MG/1; MG/1
1 TABLET ORAL EVERY 6 HOURS PRN
Qty: 120 TABLET | Refills: 0 | Status: SHIPPED | OUTPATIENT
Start: 2024-12-09 | End: 2025-01-08

## 2024-12-27 DIAGNOSIS — G62.9 NEUROPATHY: ICD-10-CM

## 2024-12-27 DIAGNOSIS — M54.9 CHRONIC NECK AND BACK PAIN: ICD-10-CM

## 2024-12-27 DIAGNOSIS — M54.2 CHRONIC NECK AND BACK PAIN: ICD-10-CM

## 2024-12-27 DIAGNOSIS — K59.03 DRUG-INDUCED CONSTIPATION: ICD-10-CM

## 2024-12-27 DIAGNOSIS — G89.29 CHRONIC NECK AND BACK PAIN: ICD-10-CM

## 2024-12-27 DIAGNOSIS — Z76.0 MEDICATION REFILL: ICD-10-CM

## 2024-12-27 NOTE — TELEPHONE ENCOUNTER
Rx Refill Note  Requested Prescriptions     Pending Prescriptions Disp Refills    baclofen (LIORESAL) 10 MG tablet 90 tablet 2     Sig: Take 1 tablet by mouth 3 (Three) Times a Day As Needed for Muscle Spasms.    linaclotide (Linzess) 72 MCG capsule capsule 90 capsule 0     Sig: Take 1 capsule by mouth.    oxyCODONE-acetaminophen (Percocet)  MG per tablet 120 tablet 0     Sig: Take 1 tablet by mouth Every 6 (Six) Hours As Needed for Moderate Pain for up to 30 days.      Last office visit with prescribing clinician: 11/4/2024   Last telemedicine visit with prescribing clinician: Visit date not found   Next office visit with prescribing clinician: Visit date not found       Brenna Phillips MA  12/27/24, 13:29 EST

## 2024-12-30 RX ORDER — OXYCODONE AND ACETAMINOPHEN 10; 325 MG/1; MG/1
1 TABLET ORAL EVERY 6 HOURS PRN
Qty: 120 TABLET | Refills: 0 | Status: SHIPPED | OUTPATIENT
Start: 2024-12-30 | End: 2025-01-29

## 2024-12-30 RX ORDER — BACLOFEN 10 MG/1
10 TABLET ORAL 3 TIMES DAILY PRN
Qty: 90 TABLET | Refills: 2 | Status: SHIPPED | OUTPATIENT
Start: 2024-12-30

## 2025-01-27 DIAGNOSIS — M54.9 CHRONIC NECK AND BACK PAIN: ICD-10-CM

## 2025-01-27 DIAGNOSIS — G89.29 CHRONIC NECK AND BACK PAIN: ICD-10-CM

## 2025-01-27 DIAGNOSIS — K59.03 DRUG-INDUCED CONSTIPATION: ICD-10-CM

## 2025-01-27 DIAGNOSIS — Z76.0 MEDICATION REFILL: ICD-10-CM

## 2025-01-27 DIAGNOSIS — G62.9 NEUROPATHY: ICD-10-CM

## 2025-01-27 DIAGNOSIS — M54.2 CHRONIC NECK AND BACK PAIN: ICD-10-CM

## 2025-01-27 RX ORDER — BACLOFEN 10 MG/1
10 TABLET ORAL 3 TIMES DAILY PRN
Qty: 90 TABLET | Refills: 2 | Status: CANCELLED | OUTPATIENT
Start: 2025-01-27

## 2025-01-28 RX ORDER — GABAPENTIN 400 MG/1
400 CAPSULE ORAL 2 TIMES DAILY
Qty: 180 CAPSULE | Refills: 0 | Status: SHIPPED | OUTPATIENT
Start: 2025-01-28

## 2025-01-28 RX ORDER — OXYCODONE AND ACETAMINOPHEN 10; 325 MG/1; MG/1
1 TABLET ORAL EVERY 6 HOURS PRN
Qty: 120 TABLET | Refills: 0 | Status: SHIPPED | OUTPATIENT
Start: 2025-01-28 | End: 2025-02-27

## 2025-01-28 NOTE — TELEPHONE ENCOUNTER
LV: 11/4/24  NV: 2/3/25  LF: 12/30/24 oxycodone 120/0; gabapentin 11/15/24 180/0  CSC: 6/6/24  UDS: 5/24/24

## 2025-01-29 ENCOUNTER — PRIOR AUTHORIZATION (OUTPATIENT)
Dept: INTERNAL MEDICINE | Facility: CLINIC | Age: 40
End: 2025-01-29
Payer: COMMERCIAL

## 2025-02-03 ENCOUNTER — OFFICE VISIT (OUTPATIENT)
Dept: INTERNAL MEDICINE | Facility: CLINIC | Age: 40
End: 2025-02-03
Payer: MEDICAID

## 2025-02-03 VITALS
OXYGEN SATURATION: 97 % | BODY MASS INDEX: 29.92 KG/M2 | SYSTOLIC BLOOD PRESSURE: 132 MMHG | TEMPERATURE: 98 F | DIASTOLIC BLOOD PRESSURE: 80 MMHG | HEIGHT: 66 IN | WEIGHT: 186.2 LBS | HEART RATE: 73 BPM

## 2025-02-03 DIAGNOSIS — M54.9 CHRONIC NECK AND BACK PAIN: Primary | ICD-10-CM

## 2025-02-03 DIAGNOSIS — Z76.0 MEDICATION REFILL: ICD-10-CM

## 2025-02-03 DIAGNOSIS — G89.29 CHRONIC NECK AND BACK PAIN: Primary | ICD-10-CM

## 2025-02-03 DIAGNOSIS — G62.9 NEUROPATHY: ICD-10-CM

## 2025-02-03 DIAGNOSIS — M54.2 CHRONIC NECK AND BACK PAIN: Primary | ICD-10-CM

## 2025-02-03 PROCEDURE — 99213 OFFICE O/P EST LOW 20 MIN: CPT | Performed by: STUDENT IN AN ORGANIZED HEALTH CARE EDUCATION/TRAINING PROGRAM

## 2025-02-03 RX ORDER — VENLAFAXINE 75 MG/1
75 TABLET ORAL DAILY
COMMUNITY

## 2025-02-03 RX ORDER — OXYCODONE AND ACETAMINOPHEN 10; 325 MG/1; MG/1
1 TABLET ORAL EVERY 6 HOURS PRN
Qty: 120 TABLET | Refills: 0 | Status: CANCELLED | OUTPATIENT
Start: 2025-02-03 | End: 2025-03-05

## 2025-02-03 NOTE — PROGRESS NOTES
Office Note     Name: Ernie Sanford    : 1985     MRN: [id number]     Chief Complaint  Arthritis    Subjective     History of Present Illness:  Ernie Sanford is a 39 y.o. male who presents today for     Follow up for chronic neck pain  He is one chronic opioid pain therapy for this. He takes percocet TID  He sees orthopedics at [facility] and was suppose to see Pain management with Dr. Gallagher for his chronic neck pain.   To discuss interventional pain management, he wants to hold off on surgery for now.    Has complaints of pain down both his arms with numbness/paresthesias. Denies motor weakness, no loss of sensation. He is able to use his hands to complete tasks but deals with aches in his joints.   In the past he has tried, PT, multiple injections, he has seen the neck doctor and interventional pain management, he has discussed having surgery for his neck.     Review of Systems:   Review of Systems   All other systems reviewed and are negative.      Past Medical History:   Past Medical History:   Diagnosis Date    Arthritis     Drug induced constipation     Headache        Past Surgical History:   Past Surgical History:   Procedure Laterality Date    HAND SURGERY Left     HERNIA REPAIR      SHOULDER SURGERY         Family History:   Family History   Problem Relation Age of Onset    Hypertension Mother     Diabetes Mother        Social History:   Social History     Socioeconomic History    Marital status:    Tobacco Use    Smoking status: Some Days     Current packs/day: 0.25     Types: Cigarettes    Smokeless tobacco: Never   Vaping Use    Vaping status: Every Day    Substances: Nicotine    Devices: Refillable tank   Substance and Sexual Activity    Alcohol use: No    Drug use: Yes     Types: Marijuana    Sexual activity: Defer       Immunizations:   Immunization History   Administered Date(s) Administered    COVID-19 (PFIZER) Purple Cap Monovalent 2021, 2021    Hep B,  "Adolescent or Pediatric 10/04/1999    Hep B, Unspecified 08/13/1996    Hepatitis B Adolescent High Risk Infant 03/20/1998    Hepatitis B Adult/Adolescent IM 01/14/2005, 02/18/2005, 04/25/2007    MMR 08/13/1996    PPD Test 01/12/2005, 01/25/2005, 04/25/2007    Td (TDVAX) 03/20/1998, 04/24/2012        Medications:     Current Outpatient Medications:     baclofen (LIORESAL) 10 MG tablet, Take 1 tablet by mouth 3 (Three) Times a Day As Needed for Muscle Spasms., Disp: 90 tablet, Rfl: 2    [facility] Saline Nasal [place] 0.65 % nasal spray, Administer 1 spray into the nostril(s) as directed by provider As Needed., Disp: , Rfl:     gabapentin (NEURONTIN) 400 MG capsule, Take 1 capsule by mouth 2 (Two) Times a Day., Disp: 180 capsule, Rfl: 0    linaclotide (Linzess) 72 MCG capsule capsule, Take 1 capsule by mouth Daily., Disp: 90 capsule, Rfl: 2    naloxone (NARCAN) 4 MG/0.1ML nasal spray, Call 911. Don't prime. [place] in 1 nostril for overdose. Repeat in 2-3 minutes in other nostril if no or minimal breathing/responsiveness., Disp: 2 each, Rfl: 1    oxyCODONE-acetaminophen (Percocet)  MG per tablet, Take 1 tablet by mouth Every 6 (Six) Hours As Needed for Moderate Pain for up to 30 days., Disp: 120 tablet, Rfl: 0    venlafaxine (EFFEXOR) 75 MG tablet, Take 1 tablet by mouth Daily., Disp: , Rfl:     ketorolac (TORADOL) 10 MG tablet, Take 1 tablet by mouth Every 6 (Six) Hours As Needed. (Patient not taking: Reported on 2/3/2025), Disp: , Rfl:     meloxicam (MOBIC) 15 MG tablet, , Disp: , Rfl:     tiZANidine (ZANAFLEX) 4 MG tablet, Take 1 tablet by mouth Every 8 (Eight) Hours As Needed for Muscle Spasms. (Patient not taking: Reported on 2/3/2025), Disp: 90 tablet, Rfl: 2    venlafaxine XR (EFFEXOR-XR) 37.5 MG 24 hr capsule, Take 1 capsule by mouth Daily. (Patient not taking: Reported on 2/3/2025), Disp: 90 capsule, Rfl: 3    Allergies:   Allergies   Allergen Reactions    Bupropion Rash     \"Snapped\" emotional change    " "Acetaminophen Rash    Aspirin Rash    Ibuprofen Rash       Objective     Vital Signs  /80   Pulse 73   Temp 98 °F (36.7 °C) (Temporal)   Ht 168.3 cm (66.26\")   Wt 84.5 kg (186 lb 3.2 oz)   SpO2 97%   BMI 29.82 kg/m²   Estimated body mass index is 29.82 kg/m² as calculated from the following:    Height as of this encounter: 168.3 cm (66.26\").    Weight as of this encounter: 84.5 kg (186 lb 3.2 oz).            Physical Exam  Constitutional:       Appearance: Normal appearance.   Cardiovascular:      Rate and Rhythm: Normal rate and regular rhythm.      Pulses: Normal pulses.   Pulmonary:      Breath sounds: Normal breath sounds.   Neurological:      Mental Status: He is alert.          Result Review :                  Assessment and Plan     1. Chronic neck and back pain     - baclofen (LIORESAL) 10 MG tablet; Take 1 tablet by mouth 3 (Three) Times a Day As Needed for Muscle Spasms.  Dispense: 90 tablet; Refill: 2  - oxyCODONE-acetaminophen (Percocet)  MG per tablet; Take 1 tablet by mouth Every 6 (Six) Hours As Needed for Moderate Pain for up to 30 days.  Dispense: 120 tablet; Refill: 0  - gabapentin (NEURONTIN) 400 MG capsule; Take 1 capsule by mouth 2 (Two) Times a Day.  Dispense: 180 capsule; Refill: 0     2. Neuropathy     - baclofen (LIORESAL) 10 MG tablet; Take 1 tablet by mouth 3 (Three) Times a Day As Needed for Muscle Spasms.  Dispense: 90 tablet; Refill: 2  - oxyCODONE-acetaminophen (Percocet)  MG per tablet; Take 1 tablet by mouth Every 6 (Six) Hours As Needed for Moderate Pain for up to 30 days.  Dispense: 120 tablet; Refill: 0  - gabapentin (NEURONTIN) 400 MG capsule; Take 1 capsule by mouth 2 (Two) Times a Day.  Dispense: 180 capsule; Refill: 0     3. Medication refill     - baclofen (LIORESAL) 10 MG tablet; Take 1 tablet by mouth 3 (Three) Times a Day As Needed for Muscle Spasms.  Dispense: 90 tablet; Refill: 2  - oxyCODONE-acetaminophen (Percocet)  MG per tablet; Take 1 " tablet by mouth Every 6 (Six) Hours As Needed for Moderate Pain for up to 30 days.  Dispense: 120 tablet; Refill: 0  - gabapentin (NEURONTIN) 400 MG capsule; Take 1 capsule by mouth 2 (Two) Times a Day.  Dispense: 180 capsule; Refill: 0       Follow Up  No follow-ups on file.    MD CLAUDIA UmanaE [facility]  [facility] PRIMARY [facility]  [address]  [address] [address] [phone]

## 2025-02-10 ENCOUNTER — PRIOR AUTHORIZATION (OUTPATIENT)
Dept: INTERNAL MEDICINE | Facility: CLINIC | Age: 40
End: 2025-02-10
Payer: MEDICAID

## 2025-02-22 DIAGNOSIS — M54.9 CHRONIC NECK AND BACK PAIN: ICD-10-CM

## 2025-02-22 DIAGNOSIS — G62.9 NEUROPATHY: ICD-10-CM

## 2025-02-22 DIAGNOSIS — Z76.0 MEDICATION REFILL: ICD-10-CM

## 2025-02-22 DIAGNOSIS — G89.29 CHRONIC NECK AND BACK PAIN: ICD-10-CM

## 2025-02-22 DIAGNOSIS — M54.2 CHRONIC NECK AND BACK PAIN: ICD-10-CM

## 2025-02-24 NOTE — TELEPHONE ENCOUNTER
Rx Refill Note  Requested Prescriptions     Pending Prescriptions Disp Refills    baclofen (LIORESAL) 10 MG tablet 90 tablet 2     Sig: Take 1 tablet by mouth 3 (Three) Times a Day As Needed for Muscle Spasms.    gabapentin (NEURONTIN) 400 MG capsule 180 capsule 0     Sig: Take 1 capsule by mouth 2 (Two) Times a Day.    oxyCODONE-acetaminophen (Percocet)  MG per tablet 120 tablet 0     Sig: Take 1 tablet by mouth Every 6 (Six) Hours As Needed for Moderate Pain for up to 30 days.    venlafaxine (EFFEXOR) 75 MG tablet       Sig: Take 1 tablet by mouth Daily.      Last office visit with prescribing clinician: 2/3/2025   Last telemedicine visit with prescribing clinician: Visit date not found   Next office visit with prescribing clinician: Visit date not found    CSA: 06/06/2024  UDS: 05/24/2024    Gely Perez MA  02/24/25, 15:34 EST

## 2025-02-25 ENCOUNTER — TELEPHONE (OUTPATIENT)
Dept: INTERNAL MEDICINE | Facility: CLINIC | Age: 40
End: 2025-02-25
Payer: MEDICAID

## 2025-02-25 RX ORDER — OXYCODONE AND ACETAMINOPHEN 10; 325 MG/1; MG/1
1 TABLET ORAL EVERY 6 HOURS PRN
Qty: 120 TABLET | Refills: 0 | Status: SHIPPED | OUTPATIENT
Start: 2025-02-25 | End: 2025-03-27

## 2025-02-25 RX ORDER — BACLOFEN 10 MG/1
10 TABLET ORAL 3 TIMES DAILY PRN
Qty: 90 TABLET | Refills: 2 | Status: SHIPPED | OUTPATIENT
Start: 2025-02-25

## 2025-02-25 RX ORDER — VENLAFAXINE 75 MG/1
75 TABLET ORAL DAILY
Qty: 90 TABLET | Refills: 1 | Status: SHIPPED | OUTPATIENT
Start: 2025-02-25

## 2025-02-25 RX ORDER — GABAPENTIN 400 MG/1
400 CAPSULE ORAL 2 TIMES DAILY
Qty: 180 CAPSULE | Refills: 0 | Status: SHIPPED | OUTPATIENT
Start: 2025-02-25

## 2025-02-25 NOTE — TELEPHONE ENCOUNTER
Caller: [facility] [id number] - [place], [place] - [address] [facility] AT [facility] - [phone]  - [phone]     Relationship: Pharmacy    Best call back number:  [phone]    Which medication are you concerned about:     venlafaxine XR (EFFEXOR-XR) 37.5 MG 24 hr capsule       Who prescribed you this medication: DR LANDAVERDE    When did you start taking this medication: ONGOING    What are your concerns: THEY RECEIVED THE RX IN TABLET FORM AND USUALLY PATIENT IS IN CAPSULE XR      PLEASE CALL TO ADVISE

## 2025-03-20 DIAGNOSIS — Z98.890 S/P ARTHROSCOPY OF RIGHT SHOULDER: Primary | ICD-10-CM

## 2025-03-20 RX ORDER — ONDANSETRON 4 MG/1
4 TABLET, FILM COATED ORAL EVERY 8 HOURS PRN
Qty: 10 TABLET | Refills: 1 | Status: SHIPPED | OUTPATIENT
Start: 2025-03-20

## 2025-03-20 RX ORDER — OXYCODONE HYDROCHLORIDE 5 MG/1
5 TABLET ORAL EVERY 4 HOURS PRN
Qty: 30 TABLET | Refills: 0 | Status: SHIPPED | OUTPATIENT
Start: 2025-03-20

## 2025-03-21 ENCOUNTER — TELEPHONE (OUTPATIENT)
Dept: ORTHOPEDIC SURGERY | Facility: CLINIC | Age: 40
End: 2025-03-21
Payer: MEDICAID

## 2025-03-21 NOTE — TELEPHONE ENCOUNTER
Caller: Ernie Safnord    Relationship: Self    Best call back number:     What is the best time to reach you: ANYTIME    Who are you requesting to speak with (clinical staff, provider,  specific staff member): DR. NOGUEIRA    What was the call regarding: PATIENT HAD RIGHT SHOULDER SURGERY YESTERDAY AND STATES THE PAIN MEDICATION HE RECEIVED FOR THE POST SURGERY PROCESS ISN'T ASSISTING HIM. PATIENT STATES HE IS IN SUCH SEVERE PAIN EVEN WHEN MIXED WITH IBUPROFEN HE IS STILL STRUGGLING TO FUNCTION OR REST.    Is it okay if the provider responds through MyChart: CALL

## 2025-03-21 NOTE — TELEPHONE ENCOUNTER
I called patient and advised him to take 2 of the oxycodone at a time over the next 24 hours to help control his pain and to take Tylenol or ibuprofen in between. He expressed understanding and will call with any further questions or concerns he may have.  Olive Melchor RT (R), ROT

## 2025-03-22 DIAGNOSIS — M54.9 CHRONIC NECK AND BACK PAIN: ICD-10-CM

## 2025-03-22 DIAGNOSIS — Z76.0 MEDICATION REFILL: ICD-10-CM

## 2025-03-22 DIAGNOSIS — M54.2 CHRONIC NECK AND BACK PAIN: ICD-10-CM

## 2025-03-22 DIAGNOSIS — G89.29 CHRONIC NECK AND BACK PAIN: ICD-10-CM

## 2025-03-22 DIAGNOSIS — G62.9 NEUROPATHY: ICD-10-CM

## 2025-03-22 RX ORDER — VENLAFAXINE 75 MG/1
75 TABLET ORAL DAILY
Qty: 90 TABLET | Refills: 1 | Status: CANCELLED | OUTPATIENT
Start: 2025-03-22

## 2025-03-25 NOTE — TELEPHONE ENCOUNTER
LV: 2/3/25  NV: not scheduled  LF: 2/25/25  CSC: 6;/6/24  UDS: 5/24/24  Patient did get refill from Dr. Cortez because the had surgery.  He will fill these next Saturday.

## 2025-03-28 RX ORDER — OXYCODONE AND ACETAMINOPHEN 10; 325 MG/1; MG/1
1 TABLET ORAL EVERY 6 HOURS PRN
Qty: 120 TABLET | Refills: 0 | Status: SHIPPED | OUTPATIENT
Start: 2025-03-28 | End: 2025-04-27